# Patient Record
Sex: FEMALE | Race: WHITE | NOT HISPANIC OR LATINO | Employment: FULL TIME
[De-identification: names, ages, dates, MRNs, and addresses within clinical notes are randomized per-mention and may not be internally consistent; named-entity substitution may affect disease eponyms.]

---

## 2017-10-08 ENCOUNTER — HEALTH MAINTENANCE LETTER (OUTPATIENT)
Age: 47
End: 2017-10-08

## 2020-02-24 ENCOUNTER — HEALTH MAINTENANCE LETTER (OUTPATIENT)
Age: 50
End: 2020-02-24

## 2020-12-13 ENCOUNTER — HEALTH MAINTENANCE LETTER (OUTPATIENT)
Age: 50
End: 2020-12-13

## 2021-02-21 ENCOUNTER — HEALTH MAINTENANCE LETTER (OUTPATIENT)
Age: 51
End: 2021-02-21

## 2021-04-17 ENCOUNTER — HEALTH MAINTENANCE LETTER (OUTPATIENT)
Age: 51
End: 2021-04-17

## 2021-09-26 ENCOUNTER — HEALTH MAINTENANCE LETTER (OUTPATIENT)
Age: 51
End: 2021-09-26

## 2022-03-13 ENCOUNTER — HEALTH MAINTENANCE LETTER (OUTPATIENT)
Age: 52
End: 2022-03-13

## 2022-05-08 ENCOUNTER — HEALTH MAINTENANCE LETTER (OUTPATIENT)
Age: 52
End: 2022-05-08

## 2023-04-23 ENCOUNTER — HEALTH MAINTENANCE LETTER (OUTPATIENT)
Age: 53
End: 2023-04-23

## 2023-06-05 ENCOUNTER — ANCILLARY ORDERS (OUTPATIENT)
Dept: INTERVENTIONAL RADIOLOGY/VASCULAR | Facility: HOSPITAL | Age: 53
End: 2023-06-05

## 2023-06-05 DIAGNOSIS — C20 RECTAL CANCER (H): ICD-10-CM

## 2023-06-06 ENCOUNTER — MYC MEDICAL ADVICE (OUTPATIENT)
Dept: INTERVENTIONAL RADIOLOGY/VASCULAR | Facility: CLINIC | Age: 53
End: 2023-06-06
Payer: COMMERCIAL

## 2023-06-06 NOTE — PROGRESS NOTES
Interventional Radiology - Pre-Procedure Note:  [facility]  06/06/2023     Procedure Requested: Port placement  Requested by: Jey Castillo MD    History and Physical Reviewed: H&P documented within 30 days (by Jey Castillo MD on 06/01/2023). I have personally reviewed the patient's medical history and have updated the medical record as necessary.    Brief HPI: Gillian Rubin is a 53 year old female with a PMH of eczema, psoriasis, and a new diagnosis of rectal cancer who presents for a port placement. Originally diagnosed 05/03/2023 via colonoscopy and biopsy. Treatment plan includes chemotherapy. Will get radiation therapy after chemo is completed. Requesting port placement.     IMAGING:  CT CHEST ABDOMEN PELVIS W 05/13/2023  EXAM: CT CHEST ABDOMEN PELVIS W   LOCATION: [facility] MEDICAL IMAGING   DATE/TIME: 5/13/2023 12:15 PM CDT     INDICATION: Rectal Mass Colorectal Polyps Colorectal Polyps. Rectal mass. Staging.   COMPARISON: None.   TECHNIQUE: CT scan of the chest, abdomen, and pelvis was performed following injection of IV contrast. Multiplanar reformats were obtained. Dose reduction techniques were used.   CONTRAST: IOHEXOL 350 MG IODINE/ML  ML BOTTLE: 80mL     FINDINGS:   LUNGS AND PLEURA: No significant pulmonary opacities. Benign calcified granuloma in the right upper lobe.     MEDIASTINUM/AXILLAE: No adenopathy. Normal variant aberrant origin of the right subclavian artery from the distal arch. Mild ectasia of the ascending thoracic aorta which measures 3.9 cm in diameter. No significant calcified atheromatous plaque.     CORONARY ARTERY CALCIFICATION: Poorly visualized. No definite plaque identified.     HEPATOBILIARY: There is a subcentimeter low-attenuation lesion in the right hepatic lobe in segment 8 almost certainly representing a benign cyst. No suspicious liver lesions.     PANCREAS: Normal.     SPLEEN: Normal.     ADRENAL GLANDS: Normal.      KIDNEYS/BLADDER: Normal.     BOWEL: Focal thickening and enhancement of the right side of the upper rectum involving approximately 50% of the circumference compatible with a primary rectal malignancy. No obstruction. Sigmoid diverticulosis without active inflammation. Small bowel loops are normal caliber. Normal appendix.     LYMPH NODES: No adenopathy. No enlarged mesorectal or superior rectal nodes identified.     VASCULATURE: Unremarkable.     PELVIC ORGANS: Uterine enlargement due 2 and approximately 9 cm mass compatible with a fibroid.     MUSCULOSKELETAL: Mild degenerative disc disease in the lower lumbar spine.    IMPRESSION:  1.  Focal thickening and enhancement of the right side of the upper rectum involving approximately 50% of the circumference compatible with a primary rectal malignancy. No obstruction.   2.  No definite evidence of metastatic disease in the chest, abdomen, or pelvis.   3.  A subcentimeter low-attenuation lesion in the right hepatic lobe is almost certainly a benign cyst. A small mucinous metastasis could have this appearance but is statistically much less likely. This can be monitored on future surveillance exams. An MRI of the liver without and with IV gadolinium could also be considered but its specificity will be limited by the small size of the lesion.   4.  Sigmoid diverticulosis without active inflammation.   5.  Large uterine fibroid measuring 9 cm.   6.  Mild ectasia of the ascending thoracic aorta which measures 3.9 cm in diameter. Normal variant aberrant origin of the right subclavian artery.    NPO: Midnight  ANTICOAGULANTS: None  ANTIBIOTICS: Ancef 2 g    ALLERGIES  No Known Allergies      LABS:  No results found for: INR   Hemoglobin   Date Value Ref Range Status   10/11/2012 13.0 11.7 - 15.7 g/dL Final     Platelet Count   Date Value Ref Range Status   10/11/2012 256 150 - 450 10e9/L Final     No results found for: CR  No results found for: POTASSIUM      EXAM:  BP  129/86   Pulse 59   Resp 29   SpO2 98%   General:  Stable.  In no acute distress.    Neuro:  A&O x 3. Moves all extremities equally.  Resp:  Lungs clear to auscultation bilaterally.  Cardio:  S1S2 and reg, without murmur, clicks or rubs.  Skin:  Without excoriations, ecchymosis, erythema, lesions or open sores on chest.    Pre-Sedation Assessment:  Mallampati Airway Classification:  I - Faucial pillars, soft palate, and uvula are visible  Previous reaction to anesthesia/sedation:  No  Sedation plan based on assessment: Moderate (conscious) sedation  ASA Classification: Class 3 - SEVERE SYSTEMIC DISEASE, DEFINITE FUNCTIONAL LIMITATIONS.   Code Status: FULL CODE      ASSESSMENT/PLAN:   Port placement with sedation, laterality per proceduralist. No contraindications to RIGHT sided port placement.    Procedural education reviewed with patient in detail including, but not limited to risks, benefits and alternatives with understanding verbalized by patient.    Total time spent on the date of the encounter: 20 minutes.      BUCK FRIAS CNP  Interventional Radiology

## 2023-06-07 ENCOUNTER — HOSPITAL ENCOUNTER (OUTPATIENT)
Dept: INTERVENTIONAL RADIOLOGY/VASCULAR | Facility: HOSPITAL | Age: 53
Discharge: HOME | End: 2023-06-07
Attending: INTERNAL MEDICINE | Admitting: RADIOLOGY
Payer: COMMERCIAL

## 2023-06-07 VITALS
OXYGEN SATURATION: 96 % | DIASTOLIC BLOOD PRESSURE: 70 MMHG | HEART RATE: 64 BPM | TEMPERATURE: 98.1 F | RESPIRATION RATE: 16 BRPM | SYSTOLIC BLOOD PRESSURE: 120 MMHG

## 2023-06-07 DIAGNOSIS — C20 RECTAL CANCER (H): ICD-10-CM

## 2023-06-07 PROCEDURE — 36561 INSERT TUNNELED CV CATH: CPT

## 2023-06-07 PROCEDURE — 250N000009 HC RX 250: Performed by: RADIOLOGY

## 2023-06-07 PROCEDURE — 272N000500 HC NEEDLE CR2

## 2023-06-07 PROCEDURE — 250N000009 HC RX 250

## 2023-06-07 PROCEDURE — 250N000011 HC RX IP 250 OP 636: Performed by: RADIOLOGY

## 2023-06-07 PROCEDURE — C1788 PORT, INDWELLING, IMP: HCPCS

## 2023-06-07 PROCEDURE — 250N000011 HC RX IP 250 OP 636

## 2023-06-07 PROCEDURE — C1769 GUIDE WIRE: HCPCS

## 2023-06-07 PROCEDURE — 99152 MOD SED SAME PHYS/QHP 5/>YRS: CPT

## 2023-06-07 RX ORDER — LEVOTHYROXINE SODIUM 25 UG/1
25 TABLET ORAL DAILY
COMMUNITY

## 2023-06-07 RX ORDER — NALOXONE HYDROCHLORIDE 0.4 MG/ML
0.4 INJECTION, SOLUTION INTRAMUSCULAR; INTRAVENOUS; SUBCUTANEOUS
Status: DISCONTINUED | OUTPATIENT
Start: 2023-06-07 | End: 2023-06-08 | Stop reason: HOSPADM

## 2023-06-07 RX ORDER — HEPARIN SODIUM (PORCINE) LOCK FLUSH IV SOLN 100 UNIT/ML 100 UNIT/ML
500 SOLUTION INTRAVENOUS ONCE
Status: COMPLETED | OUTPATIENT
Start: 2023-06-07 | End: 2023-06-07

## 2023-06-07 RX ORDER — LIDOCAINE 40 MG/G
CREAM TOPICAL
Status: DISCONTINUED | OUTPATIENT
Start: 2023-06-07 | End: 2023-06-08 | Stop reason: HOSPADM

## 2023-06-07 RX ORDER — NALOXONE HYDROCHLORIDE 0.4 MG/ML
0.2 INJECTION, SOLUTION INTRAMUSCULAR; INTRAVENOUS; SUBCUTANEOUS
Status: DISCONTINUED | OUTPATIENT
Start: 2023-06-07 | End: 2023-06-08 | Stop reason: HOSPADM

## 2023-06-07 RX ORDER — FLUMAZENIL 0.1 MG/ML
0.2 INJECTION, SOLUTION INTRAVENOUS
Status: DISCONTINUED | OUTPATIENT
Start: 2023-06-07 | End: 2023-06-08 | Stop reason: HOSPADM

## 2023-06-07 RX ORDER — SODIUM CHLORIDE 9 MG/ML
INJECTION, SOLUTION INTRAVENOUS CONTINUOUS
Status: DISCONTINUED | OUTPATIENT
Start: 2023-06-07 | End: 2023-06-08 | Stop reason: HOSPADM

## 2023-06-07 RX ORDER — CEFAZOLIN SODIUM/WATER 2 G/20 ML
2 SYRINGE (ML) INTRAVENOUS
Status: COMPLETED | OUTPATIENT
Start: 2023-06-07 | End: 2023-06-07

## 2023-06-07 RX ORDER — LIDOCAINE HYDROCHLORIDE AND EPINEPHRINE 10; 10 MG/ML; UG/ML
INJECTION, SOLUTION INFILTRATION; PERINEURAL PRN
Status: COMPLETED | OUTPATIENT
Start: 2023-06-07 | End: 2023-06-07

## 2023-06-07 RX ORDER — FENTANYL CITRATE 50 UG/ML
25-50 INJECTION, SOLUTION INTRAMUSCULAR; INTRAVENOUS EVERY 5 MIN PRN
Status: DISCONTINUED | OUTPATIENT
Start: 2023-06-07 | End: 2023-06-08 | Stop reason: HOSPADM

## 2023-06-07 RX ADMIN — LIDOCAINE HYDROCHLORIDE 15 ML: 10 INJECTION, SOLUTION INFILTRATION; PERINEURAL at 10:07

## 2023-06-07 RX ADMIN — LIDOCAINE HYDROCHLORIDE AND EPINEPHRINE 15 ML: 10; 10 INJECTION, SOLUTION INFILTRATION; PERINEURAL at 10:07

## 2023-06-07 RX ADMIN — MIDAZOLAM HYDROCHLORIDE 1 MG: 1 INJECTION, SOLUTION INTRAMUSCULAR; INTRAVENOUS at 09:47

## 2023-06-07 RX ADMIN — Medication 500 UNITS: at 09:58

## 2023-06-07 RX ADMIN — Medication 2 G: at 09:44

## 2023-06-07 RX ADMIN — MIDAZOLAM HYDROCHLORIDE 1 MG: 1 INJECTION, SOLUTION INTRAMUSCULAR; INTRAVENOUS at 09:52

## 2023-06-07 RX ADMIN — FENTANYL CITRATE 50 MCG: 50 INJECTION, SOLUTION INTRAMUSCULAR; INTRAVENOUS at 09:54

## 2023-06-07 RX ADMIN — FENTANYL CITRATE 50 MCG: 50 INJECTION, SOLUTION INTRAMUSCULAR; INTRAVENOUS at 09:49

## 2023-06-07 NOTE — PROVIDER NOTIFICATION
Discharged ambulatory with father, verbalized understanding of instructions. Belongings returned.

## 2023-06-07 NOTE — IP AVS SNAPSHOT
[facility] Interventional Radiology  [address] [place] [id number]  Phone: [phone]  Fax: [phone]                              After Visit Summary   6/7/2023    Gillian Rubin   MRN: [id number]           After Visit Summary Signature Page    I have received my discharge instructions, and my questions have been answered. I have discussed any challenges I see with this plan with the nurse or doctor.    ..........................................................................................................................................  Patient/Patient Representative Signature      ..........................................................................................................................................  Patient Representative Print Name and Relationship to Patient    ..................................................               ................................................  Date                                   Time    ..........................................................................................................................................  Reviewed by Signature/Title    ...................................................              ..............................................  Date                                               Time    22EPIC Rev 08/18

## 2023-06-07 NOTE — PRE-PROCEDURE
GENERAL PRE-PROCEDURE:   Procedure:  Port placement  Date/Time:  6/7/2023 9:30 AM    Written consent obtained?: Yes    Risks and benefits: Risks, benefits and alternatives were discussed    Consent given by:  Patient  Patient states understanding of procedure being performed: Yes    Patient's understanding of procedure matches consent: Yes    Procedure consent matches procedure scheduled: Yes    Expected level of sedation:  Moderate  Appropriately NPO:  Yes  ASA Class:  3  Mallampati  :  Grade 1- soft palate, uvula, tonsillar pillars, and posterior pharyngeal wall visible  Lungs:  Lungs clear with good breath sounds bilaterally  Heart:  Normal heart sounds and rate  History & Physical reviewed:  History and physical reviewed and no updates needed  Statement of review:  I have reviewed the lab findings, diagnostic data, medications, and the plan for sedation

## 2023-06-07 NOTE — DISCHARGE INSTRUCTIONS
Port Placement Procedure Discharge Instructions:  You had a port placed. A port is a small medical device that is placed under the skin and is connected to a vein with a catheter (thin, flexible tube). Ports can be used to administer IV medications, fluids or blood products (including chemotherapy) or for blood lab draws. Please follow the below instructions:  Care Instructions:  - If you received sedation for your procedure, do not drive or operate heavy machinery for the rest of the day.  - You may shower beginning post procedure day #1.  Do not scrub site until well healed; pat dry.  - Avoid submerging the port site under water (tub baths, Jacuzzis, hot tubs and pools) for 10 days or until glue falls off.  - You may take over the counter pain medication for discomfort. Follow the package directions.  - Avoid heavy lifting (greater than 10 pounds) and strenuous activities for 3 days.   - If you experience significant bleeding at site, apply pressure with hands above the clavicle bone, sit upright and seek immediate medical assistance.    Call [facility] ([phone]) if you experience the following:   - Uncontrolled bleeding from port site  - Fever (greater than 101 F (38.3C))  - Purulent (yellow/green/foul smelling) drainage from port insertion site.  - Increasing pain at port site  - Increasing redness at port site

## 2023-06-08 ENCOUNTER — TELEPHONE (OUTPATIENT)
Dept: INTERVENTIONAL RADIOLOGY/VASCULAR | Facility: HOSPITAL | Age: 53
End: 2023-06-08
Payer: COMMERCIAL

## 2023-06-08 NOTE — TELEPHONE ENCOUNTER
POST CALL    Spoke with: Gillian Olson  Call attempt: 1  Any pain: No  Any fever: No  Any redness/swelling/ abnormal drainage around puncture site: No  Were you instructed well enough to take care of yourself at home: Yes  Are you satisfied with the care you received: Yes  Any additional concerns or questions: No  IR [facility] number provided: Yes    Post call completed.   June 8, 2023 1:08 PM  Britt Ball RN

## 2023-09-24 ENCOUNTER — HEALTH MAINTENANCE LETTER (OUTPATIENT)
Age: 53
End: 2023-09-24

## 2024-09-30 ENCOUNTER — ANCILLARY ORDERS (OUTPATIENT)
Dept: INTERVENTIONAL RADIOLOGY/VASCULAR | Facility: HOSPITAL | Age: 54
End: 2024-09-30

## 2024-09-30 DIAGNOSIS — C20 RECTAL CANCER (H): Primary | ICD-10-CM

## 2024-11-17 ENCOUNTER — HEALTH MAINTENANCE LETTER (OUTPATIENT)
Age: 54
End: 2024-11-17

## 2024-12-20 NOTE — PROGRESS NOTES
"  Interventional Radiology - Pre-Procedure Note:  Outpatient - [facility]  12/27/2024     Procedure Requested: Port Removal, right sided  Requested by: Jey Castillo MD     History and Physical Reviewed: H&P documented within 30 days (by EMBER Pfeiffer  on 12/18/24).  I have personally reviewed the patient's medical history and have updated the medical record as necessary.    Brief HPI: Gillian Rubin is a 54 year old female with history of Rectal adenocarcinoma s/p robotic low anterior resection with diverting loop ileostomy and repair of bladder injury, hysterectomy and BSO on 12/12/23. She is s/p right internal jugular port placement 6/7/23. Now complete chemotherapy. Presenting for port removal.       NPO: Midnight.  ANTICOAGULANTS: none  ANTIBIOTICS: None indicated per SIR guidelines.    ALLERGIES  No Known Allergies      LABS:  No results found for: \"INR\"   Hemoglobin   Date Value Ref Range Status   10/11/2012 13.0 11.7 - 15.7 g/dL Final     Platelet Count   Date Value Ref Range Status   10/11/2012 256 150 - 450 10e9/L Final     No results found for: \"CR\"  No results found for: \"POTASSIUM\"      EXAM:  /76 (BP Location: Left arm)   Pulse 52   Temp 97.8  F (36.6  C) (Oral)   Resp 20   SpO2 99%   General: Stable. In no acute distress.    Neuro: Alert and oriented x 3. No focal deficits.  Psych: Appropriate mood and affect. Linear/coherent thought process.   Resp: Normal respirations. Unlabored breathing. Lungs clear to auscultation bilaterally.  Cardio: S1S2, regular rate and rhythm, without murmur, clicks or rubs  Skin: Warm and dry. Right sided port site without excoriations, ecchymosis, erythema, lesions, open sores or drainage.      Pre-Sedation Assessment:  Mallampati Airway Classification:  II - Faucial pillars and soft palate may be seen, but uvula is masked by the base of the tongue  Previous reaction to anesthesia/sedation:  No  Sedation plan based on " assessment: Moderate (conscious) sedation  ASA Classification: Class 2 - MILD SYSTEMIC DISEASE, NO ACUTE PROBLEMS, NO FUNCTIONAL LIMITATIONS.   Code Status: Full Code intra procedure, per discussion with patient.         ASSESSMENT/PLAN:   #Hx of Rectal adenocarcinoma , complete with treatment.    Port removal with sedation, Right sided.    Procedural education reviewed with patient/family in detail including, but not limited to risks, benefits and alternatives with understanding verbalized by patient/family.      Total time spent on the date of the encounter: 45 minutes.      BUCK BELLO CNP  Interventional Radiology

## 2024-12-26 ENCOUNTER — TELEPHONE (OUTPATIENT)
Dept: INTERVENTIONAL RADIOLOGY/VASCULAR | Facility: CLINIC | Age: 54
End: 2024-12-26
Payer: COMMERCIAL

## 2024-12-27 ENCOUNTER — HOSPITAL ENCOUNTER (OUTPATIENT)
Dept: INTERVENTIONAL RADIOLOGY/VASCULAR | Facility: HOSPITAL | Age: 54
Discharge: HOME | End: 2024-12-27
Attending: INTERNAL MEDICINE | Admitting: STUDENT IN AN ORGANIZED HEALTH CARE EDUCATION/TRAINING PROGRAM
Payer: COMMERCIAL

## 2024-12-27 VITALS
HEART RATE: 61 BPM | OXYGEN SATURATION: 98 % | SYSTOLIC BLOOD PRESSURE: 104 MMHG | DIASTOLIC BLOOD PRESSURE: 67 MMHG | TEMPERATURE: 97.6 F | RESPIRATION RATE: 16 BRPM

## 2024-12-27 DIAGNOSIS — C20 RECTAL CANCER (H): ICD-10-CM

## 2024-12-27 PROCEDURE — 250N000011 HC RX IP 250 OP 636: Performed by: NURSE PRACTITIONER

## 2024-12-27 PROCEDURE — 250N000011 HC RX IP 250 OP 636: Performed by: STUDENT IN AN ORGANIZED HEALTH CARE EDUCATION/TRAINING PROGRAM

## 2024-12-27 PROCEDURE — 99152 MOD SED SAME PHYS/QHP 5/>YRS: CPT

## 2024-12-27 RX ORDER — NALOXONE HYDROCHLORIDE 0.4 MG/ML
0.2 INJECTION, SOLUTION INTRAMUSCULAR; INTRAVENOUS; SUBCUTANEOUS
Status: DISCONTINUED | OUTPATIENT
Start: 2024-12-27 | End: 2024-12-28 | Stop reason: HOSPADM

## 2024-12-27 RX ORDER — NALOXONE HYDROCHLORIDE 0.4 MG/ML
0.4 INJECTION, SOLUTION INTRAMUSCULAR; INTRAVENOUS; SUBCUTANEOUS
Status: DISCONTINUED | OUTPATIENT
Start: 2024-12-27 | End: 2024-12-28 | Stop reason: HOSPADM

## 2024-12-27 RX ORDER — HEPARIN SODIUM 200 [USP'U]/100ML
1 INJECTION, SOLUTION INTRAVENOUS EVERY 5 MIN PRN
Status: DISCONTINUED | OUTPATIENT
Start: 2024-12-27 | End: 2024-12-28 | Stop reason: HOSPADM

## 2024-12-27 RX ORDER — FLUMAZENIL 0.1 MG/ML
0.2 INJECTION, SOLUTION INTRAVENOUS
Status: DISCONTINUED | OUTPATIENT
Start: 2024-12-27 | End: 2024-12-28 | Stop reason: HOSPADM

## 2024-12-27 RX ORDER — LIDOCAINE HYDROCHLORIDE AND EPINEPHRINE 10; 10 MG/ML; UG/ML
1-20 INJECTION, SOLUTION INFILTRATION; PERINEURAL
Status: COMPLETED | OUTPATIENT
Start: 2024-12-27 | End: 2024-12-27

## 2024-12-27 RX ORDER — ONDANSETRON 2 MG/ML
4 INJECTION INTRAMUSCULAR; INTRAVENOUS
Status: DISCONTINUED | OUTPATIENT
Start: 2024-12-27 | End: 2024-12-28 | Stop reason: HOSPADM

## 2024-12-27 RX ORDER — FENTANYL CITRATE 50 UG/ML
25-50 INJECTION, SOLUTION INTRAMUSCULAR; INTRAVENOUS EVERY 5 MIN PRN
Status: DISCONTINUED | OUTPATIENT
Start: 2024-12-27 | End: 2024-12-28 | Stop reason: HOSPADM

## 2024-12-27 RX ORDER — LIDOCAINE 40 MG/G
CREAM TOPICAL
Status: DISCONTINUED | OUTPATIENT
Start: 2024-12-27 | End: 2024-12-28 | Stop reason: HOSPADM

## 2024-12-27 RX ADMIN — LIDOCAINE HYDROCHLORIDE,EPINEPHRINE BITARTRATE 5 ML: 10; .01 INJECTION, SOLUTION INFILTRATION; PERINEURAL at 12:34

## 2024-12-27 RX ADMIN — MIDAZOLAM HYDROCHLORIDE 1 MG: 1 INJECTION, SOLUTION INTRAMUSCULAR; INTRAVENOUS at 12:36

## 2024-12-27 RX ADMIN — FENTANYL CITRATE 50 MCG: 50 INJECTION, SOLUTION INTRAMUSCULAR; INTRAVENOUS at 12:32

## 2024-12-27 RX ADMIN — MIDAZOLAM HYDROCHLORIDE 1 MG: 1 INJECTION, SOLUTION INTRAMUSCULAR; INTRAVENOUS at 12:29

## 2024-12-27 NOTE — SEDATION DOCUMENTATION
Patient Name: Gillian Rubin  Medical Record Number: [id number]  Today's Date: 12/27/2024    Procedure: Right side port removal  Proceduralist: Dr. Nugent    Procedure Start: 1229  Procedure end: 1244  Sedation medications administered: 2 mg midazolam and 50 mcg fentanyl   Sedation time: 15 minutes    Other Notes: Pt arrived to IR room 2 from Pre/post bay 1. Consent reviewed. Pt denies any questions or concerns regarding procedure. Pt positioned Supine and monitored per protocol. Pt tolerated procedure without any noted complications. VSS on Transfer. Pt transferred back to Pre/post bay 1.

## 2024-12-27 NOTE — DISCHARGE INSTRUCTIONS
Port Removal Discharge Instructions:  You had your port-a-catheter removed today. Please follow the below instructions following your port removal:    Care Instructions:  - Avoid tub baths, Jacuzzi and pool soaks for 10 days.  - You may shower beginning tomorrow. Do not scrub site until well healed; pat dry.  - If you experience significant bleeding at site, apply pressure with hands above the clavicle bone, sit upright.    Seek medical assistance for any of the following:   - Uncontrolled bleeding.  - You have a fever (greater than 101 F (38.3C)).  - Purulent (yellow/green/foul smelling) drainage from previous catheter insertion site.  - Increasing redness at previous catheter insertion site.  - Increasing pain at previous catheter insertion site.  - Increasing swelling at previous catheter insertion site.    Contact [facility] at [phone] with questions or concerns.

## 2024-12-27 NOTE — PRE-PROCEDURE
GENERAL PRE-PROCEDURE:   Procedure:  Right side port removal  Date/Time:  12/27/2024 11:36 AM    Written consent obtained?: Yes    Risks and benefits: Risks, benefits and alternatives were discussed    Consent given by:  Patient  Patient states understanding of procedure being performed: Yes    Patient's understanding of procedure matches consent: Yes    Procedure consent matches procedure scheduled: Yes    Expected level of sedation:  Moderate  Appropriately NPO:  Yes  ASA Class:  2  Mallampati  :  Grade 2- soft palate, base of uvula, tonsillar pillars, and portion of posterior pharyngeal wall visible  Lungs:  Lungs clear with good breath sounds bilaterally  Heart:  Normal heart sounds and rate  History & Physical reviewed:  History and physical reviewed and no updates needed  Statement of review:  I have reviewed the lab findings, diagnostic data, medications, and the plan for sedation

## (undated) RX ORDER — ONDANSETRON 2 MG/ML
INJECTION INTRAMUSCULAR; INTRAVENOUS
Status: DISPENSED
Start: 2024-12-27

## (undated) RX ORDER — CEFAZOLIN SODIUM/WATER 2 G/20 ML
SYRINGE (ML) INTRAVENOUS
Status: DISPENSED
Start: 2023-06-07

## (undated) RX ORDER — FENTANYL CITRATE 50 UG/ML
INJECTION, SOLUTION INTRAMUSCULAR; INTRAVENOUS
Status: DISPENSED
Start: 2024-12-27

## (undated) RX ORDER — FENTANYL CITRATE 50 UG/ML
INJECTION, SOLUTION INTRAMUSCULAR; INTRAVENOUS
Status: DISPENSED
Start: 2023-06-07

## (undated) RX ORDER — LIDOCAINE HYDROCHLORIDE AND EPINEPHRINE 10; 10 MG/ML; UG/ML
INJECTION, SOLUTION INFILTRATION; PERINEURAL
Status: DISPENSED
Start: 2024-12-27